# Patient Record
Sex: FEMALE | Race: WHITE
[De-identification: names, ages, dates, MRNs, and addresses within clinical notes are randomized per-mention and may not be internally consistent; named-entity substitution may affect disease eponyms.]

---

## 2019-09-16 ENCOUNTER — HOSPITAL ENCOUNTER (OUTPATIENT)
Dept: HOSPITAL 59 - SUR | Age: 64
Discharge: HOME | End: 2019-09-16
Attending: SURGERY
Payer: COMMERCIAL

## 2019-09-16 DIAGNOSIS — I10: ICD-10-CM

## 2019-09-16 DIAGNOSIS — C44.529: Primary | ICD-10-CM

## 2019-09-16 PROCEDURE — 12032 INTMD RPR S/A/T/EXT 2.6-7.5: CPT

## 2019-09-16 PROCEDURE — 11604 EXC TR-EXT MAL+MARG 3.1-4 CM: CPT

## 2019-09-16 PROCEDURE — 00400 ANES INTEGUMENTARY SYS NOS: CPT

## 2019-09-18 NOTE — OPERATIVE NOTE
DATE OF SURGERY: 09/16/2019 



SURGEON: Willard Fields DO



PREOPERATIVE DIAGNOSIS: Left upper chest wall mass. 



POSTOPERATIVE DIAGNOSIS: Left upper chest wall mass. 



OPERATION: Wide excision of chest wall mass. 



INDICATION: The patient is a 64-year-old male who presented to the office with a
newfound mass in her left upper chest wall. This appeared to be probably basal 
cell cancer. We did discuss wide excision. Risks, benefits, and alternatives 
were discussed. Risks include bleeding, infection, positive margins, need for 
re-excision. She understood this fully. Thereafter, consent was signed and 
questions answered.



PROCEDURE: The patient was taken to the operating room and placed in a supine 
position. General anesthesia was administered per the department of anesthesia. 
The patient's left arm was tucked to the side and her chest was prepped and 
draped in the usual fashion. Then 5 mm margins were mapped out. The area was 
anesthetized with a total of 10 mL of 0.25% Sensorcaine with epinephrine. 
Oblique incision was made. This was carried down to subcutaneous tissue. This 
measured about 10 x 4 cm into the subcu. This was fully excised. The lateral and
anterior margins were tagged. The wound was undermined and closed with 2-0 
Vicryl and 3-0 nylon. The patient was taken to the recovery room in stable 
condition. Final pathology pending. 

[facility]